# Patient Record
Sex: MALE | Employment: STUDENT | URBAN - METROPOLITAN AREA
[De-identification: names, ages, dates, MRNs, and addresses within clinical notes are randomized per-mention and may not be internally consistent; named-entity substitution may affect disease eponyms.]

---

## 2017-03-16 ENCOUNTER — APPOINTMENT (OUTPATIENT)
Dept: LAB | Age: 22
End: 2017-03-16
Attending: INTERNAL MEDICINE
Payer: COMMERCIAL

## 2017-03-16 ENCOUNTER — OFFICE VISIT (OUTPATIENT)
Dept: INTERNAL MEDICINE CLINIC | Facility: CLINIC | Age: 22
End: 2017-03-16

## 2017-03-16 VITALS
RESPIRATION RATE: 18 BRPM | DIASTOLIC BLOOD PRESSURE: 72 MMHG | WEIGHT: 149.38 LBS | BODY MASS INDEX: 24.01 KG/M2 | TEMPERATURE: 98 F | HEIGHT: 66 IN | SYSTOLIC BLOOD PRESSURE: 118 MMHG | HEART RATE: 82 BPM

## 2017-03-16 DIAGNOSIS — R61 HYPERHIDROSIS: ICD-10-CM

## 2017-03-16 DIAGNOSIS — Z00.00 ROUTINE PHYSICAL EXAMINATION: ICD-10-CM

## 2017-03-16 DIAGNOSIS — Z00.00 ROUTINE PHYSICAL EXAMINATION: Primary | ICD-10-CM

## 2017-03-16 LAB — TSH SERPL-ACNC: 1.09 UIU/ML (ref 0.34–5.6)

## 2017-03-16 PROCEDURE — 84443 ASSAY THYROID STIM HORMONE: CPT

## 2017-03-16 PROCEDURE — 36415 COLL VENOUS BLD VENIPUNCTURE: CPT

## 2017-03-16 PROCEDURE — 99385 PREV VISIT NEW AGE 18-39: CPT | Performed by: INTERNAL MEDICINE

## 2017-03-16 NOTE — PROGRESS NOTES
HPI:    Patient ID: Raquel Welch is a 25year old male. HPI  Patient here requesting a physical exam.  He has no past medical history or chronic conditions. He has had lifelong problems with sweaty hands.   Try prescription Bereket in the past and seemed PHYSICAL EXAM:   Physical Exam    Constitutional: He appears well-developed and well-nourished.    HENT:   Right Ear: Tympanic membrane and ear canal normal.   Left Ear: Tympanic membrane and ear canal normal.   Nose: Nose normal.   Mouth/Throat: Keon Sides prescriptions requested or ordered in this encounter    Imaging & Referrals:  None         ID#[id number]

## 2017-06-30 ENCOUNTER — APPOINTMENT (OUTPATIENT)
Dept: OTHER | Facility: HOSPITAL | Age: 22
End: 2017-06-30
Attending: EMERGENCY MEDICINE

## 2020-01-03 ENCOUNTER — OFFICE VISIT (OUTPATIENT)
Dept: INTERNAL MEDICINE CLINIC | Facility: CLINIC | Age: 25
End: 2020-01-03
Payer: COMMERCIAL

## 2020-01-03 VITALS
WEIGHT: 147.31 LBS | BODY MASS INDEX: 23.67 KG/M2 | HEART RATE: 77 BPM | SYSTOLIC BLOOD PRESSURE: 120 MMHG | DIASTOLIC BLOOD PRESSURE: 82 MMHG | HEIGHT: 66 IN

## 2020-01-03 DIAGNOSIS — Z00.00 ANNUAL PHYSICAL EXAM: Primary | ICD-10-CM

## 2020-01-03 PROCEDURE — 99395 PREV VISIT EST AGE 18-39: CPT | Performed by: INTERNAL MEDICINE

## 2020-01-03 NOTE — H&P
Elier Morales is a 22year old male who presents for a complete physical exam.   HPI:   In general he feels well. He does request STD testing. No prior history of or exposure to STDs.   He has had approximately 20-30 lifetime female sexual partners and al vomiting abdominal pain diarrhea constipation or rectal bleeding  : Occasional urinary frequency.   No dysuria or hematuria  MUSCULOSKELETAL: No leg swelling  NEURO: No headaches    EXAM:   GENERAL: Pleasant male appearing well in no distress  /82

## 2020-01-03 NOTE — PATIENT INSTRUCTIONS
Please obtain blood and urine testing soon. Please try to follow a healthy diet and exercise regularly. Please avoid all tobacco products. You should get a flu shot every fall.

## 2020-01-10 LAB
ALBUMIN/GLOBULIN RATIO: 1.6 (CALC) (ref 1–2.5)
ALBUMIN: 4.6 G/DL (ref 3.6–5.1)
ALKALINE PHOSPHATASE: 73 U/L (ref 40–115)
ALT: 17 U/L (ref 9–46)
AST: 16 U/L (ref 10–40)
BILIRUBIN, TOTAL: 0.6 MG/DL (ref 0.2–1.2)
BUN: 12 MG/DL (ref 7–25)
CALCIUM: 9.6 MG/DL (ref 8.6–10.3)
CARBON DIOXIDE: 28 MMOL/L (ref 20–32)
CHLAMYDIA TRACHOMATIS$RNA, TMA: NOT DETECTED
CHLORIDE: 101 MMOL/L (ref 98–110)
CHOL/HDLC RATIO: 3.8 (CALC)
CHOLESTEROL, TOTAL: 215 MG/DL
CREATININE: 0.74 MG/DL (ref 0.6–1.35)
EGFR IF AFRICN AM: 151 ML/MIN/1.73M2
EGFR IF NONAFRICN AM: 130 ML/MIN/1.73M2
GLOBULIN: 2.8 G/DL (CALC) (ref 1.9–3.7)
GLUCOSE: 102 MG/DL (ref 65–99)
HDL CHOLESTEROL: 56 MG/DL
HEMATOCRIT: 49.7 % (ref 38.5–50)
HEMOGLOBIN: 17.2 G/DL (ref 13.2–17.1)
LDL-CHOLESTEROL: 143 MG/DL (CALC)
MCH: 31.3 PG (ref 27–33)
MCHC: 34.6 G/DL (ref 32–36)
MCV: 90.4 FL (ref 80–100)
MPV: 10.7 FL (ref 7.5–12.5)
NEISSERIA GONORRHOEAE$RNA, TMA: NOT DETECTED
NON-HDL CHOLESTEROL: 159 MG/DL (CALC)
PLATELET COUNT: 295 THOUSAND/UL (ref 140–400)
POTASSIUM: 4.1 MMOL/L (ref 3.5–5.3)
PROTEIN, TOTAL: 7.4 G/DL (ref 6.1–8.1)
RDW: 11.7 % (ref 11–15)
RED BLOOD CELL COUNT: 5.5 MILLION/UL (ref 4.2–5.8)
SIGNAL TO CUT-OFF: 0.02
SODIUM: 139 MMOL/L (ref 135–146)
TRIGLYCERIDES: 66 MG/DL
WHITE BLOOD CELL COUNT: 7.8 THOUSAND/UL (ref 3.8–10.8)

## 2021-05-05 ENCOUNTER — LAB REQUISITION (OUTPATIENT)
Dept: LAB | Facility: HOSPITAL | Age: 26
End: 2021-05-05
Payer: COMMERCIAL

## 2021-05-05 DIAGNOSIS — Z01.818 ENCOUNTER FOR OTHER PREPROCEDURAL EXAMINATION: ICD-10-CM

## 2024-02-05 ENCOUNTER — LAB ENCOUNTER (OUTPATIENT)
Dept: LAB | Facility: HOSPITAL | Age: 29
End: 2024-02-05
Attending: INTERNAL MEDICINE
Payer: MEDICAID

## 2024-02-05 ENCOUNTER — OFFICE VISIT (OUTPATIENT)
Facility: CLINIC | Age: 29
End: 2024-02-05
Payer: MEDICAID

## 2024-02-05 VITALS
RESPIRATION RATE: 18 BRPM | DIASTOLIC BLOOD PRESSURE: 70 MMHG | HEART RATE: 98 BPM | BODY MASS INDEX: 26.05 KG/M2 | TEMPERATURE: 98 F | SYSTOLIC BLOOD PRESSURE: 116 MMHG | HEIGHT: 66.5 IN | WEIGHT: 164 LBS

## 2024-02-05 DIAGNOSIS — Z00.00 ROUTINE PHYSICAL EXAMINATION: ICD-10-CM

## 2024-02-05 DIAGNOSIS — R19.7 DIARRHEA, UNSPECIFIED TYPE: ICD-10-CM

## 2024-02-05 DIAGNOSIS — Z23 NEED FOR VACCINATION: ICD-10-CM

## 2024-02-05 DIAGNOSIS — Z00.00 ROUTINE PHYSICAL EXAMINATION: Primary | ICD-10-CM

## 2024-02-05 DIAGNOSIS — N50.811 RIGHT TESTICULAR PAIN: ICD-10-CM

## 2024-02-05 LAB
ALBUMIN SERPL-MCNC: 4.9 G/DL (ref 3.2–4.8)
ALBUMIN/GLOB SERPL: 1.6 {RATIO} (ref 1–2)
ALP LIVER SERPL-CCNC: 82 U/L
ALT SERPL-CCNC: 29 U/L
ANION GAP SERPL CALC-SCNC: 8 MMOL/L (ref 0–18)
AST SERPL-CCNC: 25 U/L (ref ?–34)
BASOPHILS # BLD AUTO: 0.02 X10(3) UL (ref 0–0.2)
BASOPHILS NFR BLD AUTO: 0.3 %
BILIRUB SERPL-MCNC: 0.9 MG/DL (ref 0.3–1.2)
BUN BLD-MCNC: 10 MG/DL (ref 9–23)
BUN/CREAT SERPL: 11.1 (ref 10–20)
CALCIUM BLD-MCNC: 9.7 MG/DL (ref 8.7–10.4)
CHLORIDE SERPL-SCNC: 106 MMOL/L (ref 98–112)
CHOLEST SERPL-MCNC: 231 MG/DL (ref ?–200)
CO2 SERPL-SCNC: 27 MMOL/L (ref 21–32)
CREAT BLD-MCNC: 0.9 MG/DL
DEPRECATED RDW RBC AUTO: 41.8 FL (ref 35.1–46.3)
EGFRCR SERPLBLD CKD-EPI 2021: 120 ML/MIN/1.73M2 (ref 60–?)
EOSINOPHIL # BLD AUTO: 0.11 X10(3) UL (ref 0–0.7)
EOSINOPHIL NFR BLD AUTO: 1.4 %
ERYTHROCYTE [DISTWIDTH] IN BLOOD BY AUTOMATED COUNT: 12.4 % (ref 11–15)
FASTING PATIENT LIPID ANSWER: YES
FASTING STATUS PATIENT QL REPORTED: YES
GLOBULIN PLAS-MCNC: 3.1 G/DL (ref 2.8–4.4)
GLUCOSE BLD-MCNC: 118 MG/DL (ref 70–99)
HCT VFR BLD AUTO: 50 %
HDLC SERPL-MCNC: 43 MG/DL (ref 40–59)
HGB BLD-MCNC: 17.2 G/DL
IMM GRANULOCYTES # BLD AUTO: 0.01 X10(3) UL (ref 0–1)
IMM GRANULOCYTES NFR BLD: 0.1 %
LDLC SERPL CALC-MCNC: 160 MG/DL (ref ?–100)
LYMPHOCYTES # BLD AUTO: 2.83 X10(3) UL (ref 1–4)
LYMPHOCYTES NFR BLD AUTO: 35.5 %
MCH RBC QN AUTO: 31.7 PG (ref 26–34)
MCHC RBC AUTO-ENTMCNC: 34.4 G/DL (ref 31–37)
MCV RBC AUTO: 92.1 FL
MONOCYTES # BLD AUTO: 0.51 X10(3) UL (ref 0.1–1)
MONOCYTES NFR BLD AUTO: 6.4 %
NEUTROPHILS # BLD AUTO: 4.5 X10 (3) UL (ref 1.5–7.7)
NEUTROPHILS # BLD AUTO: 4.5 X10(3) UL (ref 1.5–7.7)
NEUTROPHILS NFR BLD AUTO: 56.3 %
NONHDLC SERPL-MCNC: 188 MG/DL (ref ?–130)
OSMOLALITY SERPL CALC.SUM OF ELEC: 292 MOSM/KG (ref 275–295)
PLATELET # BLD AUTO: 276 10(3)UL (ref 150–450)
POTASSIUM SERPL-SCNC: 4.4 MMOL/L (ref 3.5–5.1)
PROT SERPL-MCNC: 8 G/DL (ref 5.7–8.2)
RBC # BLD AUTO: 5.43 X10(6)UL
SODIUM SERPL-SCNC: 141 MMOL/L (ref 136–145)
TRIGL SERPL-MCNC: 156 MG/DL (ref 30–149)
TSI SER-ACNC: 1.76 MIU/ML (ref 0.55–4.78)
VLDLC SERPL CALC-MCNC: 30 MG/DL (ref 0–30)
WBC # BLD AUTO: 8 X10(3) UL (ref 4–11)

## 2024-02-05 PROCEDURE — 85025 COMPLETE CBC W/AUTO DIFF WBC: CPT

## 2024-02-05 PROCEDURE — 80053 COMPREHEN METABOLIC PANEL: CPT

## 2024-02-05 PROCEDURE — 84443 ASSAY THYROID STIM HORMONE: CPT

## 2024-02-05 PROCEDURE — 36415 COLL VENOUS BLD VENIPUNCTURE: CPT

## 2024-02-05 PROCEDURE — 80061 LIPID PANEL: CPT

## 2024-02-05 RX ORDER — TRAMADOL HYDROCHLORIDE 50 MG/1
1 TABLET ORAL EVERY 6 HOURS PRN
COMMUNITY
End: 2024-02-05 | Stop reason: ALTCHOICE

## 2024-02-05 RX ORDER — OCTISALATE, AVOBENZONE, HOMOSALATE, AND OCTOCRYLENE 29.4; 29.4; 49; 25.48 MG/ML; MG/ML; MG/ML; MG/ML
LOTION TOPICAL
COMMUNITY
End: 2024-02-05

## 2024-02-05 RX ORDER — AMOXICILLIN 500 MG/1
500 TABLET, FILM COATED ORAL EVERY 8 HOURS
COMMUNITY
End: 2024-02-05 | Stop reason: ALTCHOICE

## 2024-02-05 RX ORDER — PSYLLIUM HUSK 0.4 G
CAPSULE ORAL
COMMUNITY
End: 2024-02-05 | Stop reason: ALTCHOICE

## 2024-02-05 RX ORDER — IBUPROFEN 600 MG/1
1 TABLET ORAL EVERY 6 HOURS PRN
COMMUNITY
End: 2024-02-05 | Stop reason: ALTCHOICE

## 2024-02-05 RX ORDER — HYDROCODONE BITARTRATE AND ACETAMINOPHEN 10; 325 MG/1; MG/1
1 TABLET ORAL
COMMUNITY
End: 2024-02-05 | Stop reason: ALTCHOICE

## 2024-02-05 NOTE — PROGRESS NOTES
HPI:    Patient ID: Rick Pritchett is a 28 year old male.    HPI  Patient is here requesting a physical exam.  He was last seen in internal medicine by different doctor in 2020 for physical exam and STD check.  Current medications reviewed.  Health maintenance and vaccination status reviewed.     Pt gets diarrhea randomly; less since he started working out. Diarrhea on and off for years. Can occur with going out to eat. Sometimes with greasy food and meat. No blood in stool. [place] occurs right after he eats lunch or dinner. Each episode is a few times of watery diarrhea; no blood; has tried more fiber. Less diarrhea with exercising more. He discussed it with the doctor 3 years ago; he never followed up with GI. Has bump on the lower right leg for years; it is not changing. Has pain in the right testicle; had it intermittently for a week about a month ago; then again last week; not active now. No issues with urination or sexual function. Appetite is good. Diet is better; not eating out as much. No tobacco. Occasional THC, but less so now. EtOH is on the weekends- 2-3 drinks.     Patient Active Problem List   Diagnosis    Hyperhidrosis    Hypercholesterolemia          HISTORY:  Past Medical History:   Diagnosis Date    Hypercholesterolemia       History reviewed. No pertinent surgical history.   Family History   Problem Relation Age of Onset    Hypertension Maternal Grandfather     Prostate Cancer Maternal Grandfather     Hypertension Maternal Grandmother     Other (Other) Maternal Grandmother         Renal failure    Lipids Paternal Grandfather         Hyperlipidemia    Hypertension Mother       Social History     Socioeconomic History    Marital status: Single   Tobacco Use    Smoking status: Never    Smokeless tobacco: Never   Vaping Use    Vaping Use: Never used   Substance and Sexual Activity    Alcohol use: Yes     Alcohol/week: 0.0 standard drinks of alcohol     Comment: 5 beers 3 days weekly    Drug use: Yes      Frequency: 1.0 times per week     Types: Cannabis    Sexual activity: Not Currently     Partners: Female          Review of Systems          No current outpatient medications on file.     Allergies:No Known Allergies     PHYSICAL EXAM:   /70 (BP Location: Left arm, Patient Position: Sitting, Cuff Size: large)   Pulse 98   Temp 98.2 °F (36.8 °C) (Other)   Resp 18   Ht 5' 6.5\" (1.689 m)   Wt 164 lb (74.4 kg)   BMI 26.07 kg/m²      Physical Exam  Constitutional:       Appearance: Normal appearance. He is well-developed.   HENT:      Right Ear: Tympanic membrane and ear canal normal.      Left Ear: Tympanic membrane and ear canal normal.      Nose: Nose normal.      Mouth/Throat:      Pharynx: No oropharyngeal exudate or posterior oropharyngeal erythema.   Eyes:      Conjunctiva/sclera: Conjunctivae normal.      Pupils: Pupils are equal, round, and reactive to light.   Neck:      Thyroid: No thyromegaly.      Vascular: No carotid bruit.   Cardiovascular:      Rate and Rhythm: Normal rate and regular rhythm.      Pulses: Normal pulses.      Heart sounds: Normal heart sounds. No murmur heard.  Pulmonary:      Effort: Pulmonary effort is normal.      Breath sounds: Normal breath sounds. No wheezing or rales.   Abdominal:      General: Bowel sounds are normal.      Palpations: Abdomen is soft. There is no mass.      Tenderness: There is no abdominal tenderness.      Hernia: There is no hernia in the left inguinal area.   Genitourinary:     Penis: Normal and circumcised.       Testes: Normal.   Musculoskeletal:      Right lower leg: No edema.      Left lower leg: No edema.   Lymphadenopathy:      Cervical: No cervical adenopathy.   Skin:     General: Skin is warm and dry.      Findings: No rash.   Neurological:      General: No focal deficit present.      Mental Status: He is alert.      Cranial Nerves: No cranial nerve deficit.      Coordination: Coordination normal.   Psychiatric:         Mood and Affect:  Mood normal.         Behavior: Behavior normal.         Thought Content: Thought content normal.         Judgment: Judgment normal.          Wt Readings from Last 6 Encounters:   02/05/24 164 lb (74.4 kg)   01/03/20 147 lb 4.8 oz (66.8 kg)   03/16/17 149 lb 6.4 oz (67.8 kg)             ASSESSMENT/PLAN:   1. Routine physical examination  Physical exam is unremarkable.  Active issues as below.  Health maintenance issues reviewed.  We will check fasting blood work and contact patient with results.  Encouraged healthy diet and regular exercise.  Maintain healthy body weight.  - CBC With Differential With Platelet; Future  - Comp Metabolic Panel (14); Future  - Lipid Panel; Future  - TSH W Reflex To Free T4; Future    2. Diarrhea, unspecified type  Patient with intermittent diarrhea.  Most likely functional.  Doubt any serious pathology.  Check blood work.  Given printed information about lactose intolerance.  Advised that the for step is to do is to cut out lactose altogether and see if that helps or not.  Contact the office if things or not improving.  May consider GI consult or stool studies if things become more persistent and worse.    3. Right testicular pain  Patient with a couple episodes of right testicular pain.  Exam is normal.  Given the symptoms and his age, we will go ahead and check ultrasound to evaluate for underlying testicular cancer.  - US SCROTUM W/ DOPPLER (CPT=93975/23999); Future    4. Need for vaccination  Vaccination status reviewed.  Given tetanus booster today.  - TdaP (Boostrix/Adacel) Vaccine (> 7 Y)         Meds This Visit:  Requested Prescriptions      No prescriptions requested or ordered in this encounter       Imaging & Referrals:  TETANUS, DIPHTHERIA TOXOIDS AND ACELLULAR PERTUSIS VACCINE (TDAP), >7 YEARS, IM USE  US SCROTUM W/ DOPPLER (NCK=96842/92539)         Selwyn Smith MD

## 2024-02-20 ENCOUNTER — HOSPITAL ENCOUNTER (OUTPATIENT)
Dept: ULTRASOUND IMAGING | Facility: HOSPITAL | Age: 29
Discharge: HOME | End: 2024-02-20
Attending: INTERNAL MEDICINE
Payer: MEDICAID

## 2024-02-20 DIAGNOSIS — N50.811 RIGHT TESTICULAR PAIN: ICD-10-CM

## 2024-02-20 PROCEDURE — 76870 US EXAM SCROTUM: CPT | Performed by: INTERNAL MEDICINE

## 2024-02-20 PROCEDURE — 93975 VASCULAR STUDY: CPT | Performed by: INTERNAL MEDICINE

## 2024-12-10 ENCOUNTER — LAB ENCOUNTER (OUTPATIENT)
Dept: LAB | Facility: HOSPITAL | Age: 29
End: 2024-12-10
Attending: NURSE PRACTITIONER
Payer: COMMERCIAL

## 2024-12-10 ENCOUNTER — OFFICE VISIT (OUTPATIENT)
Facility: CLINIC | Age: 29
End: 2024-12-10
Payer: COMMERCIAL

## 2024-12-10 VITALS
SYSTOLIC BLOOD PRESSURE: 137 MMHG | BODY MASS INDEX: 27.93 KG/M2 | WEIGHT: 175.88 LBS | DIASTOLIC BLOOD PRESSURE: 82 MMHG | HEIGHT: 66.5 IN | HEART RATE: 103 BPM

## 2024-12-10 DIAGNOSIS — R19.7 DIARRHEA, UNSPECIFIED TYPE: Primary | ICD-10-CM

## 2024-12-10 DIAGNOSIS — R19.7 DIARRHEA, UNSPECIFIED TYPE: ICD-10-CM

## 2024-12-10 LAB
CRP SERPL-MCNC: <0.4 MG/DL (ref ?–1)
IGA SERPL-MCNC: 277.4 MG/DL (ref 40–350)
TSI SER-ACNC: 1.9 UIU/ML (ref 0.55–4.78)

## 2024-12-10 PROCEDURE — 3079F DIAST BP 80-89 MM HG: CPT | Performed by: NURSE PRACTITIONER

## 2024-12-10 PROCEDURE — 86140 C-REACTIVE PROTEIN: CPT

## 2024-12-10 PROCEDURE — 3075F SYST BP GE 130 - 139MM HG: CPT | Performed by: NURSE PRACTITIONER

## 2024-12-10 PROCEDURE — 84443 ASSAY THYROID STIM HORMONE: CPT

## 2024-12-10 PROCEDURE — 86364 TISS TRNSGLTMNASE EA IG CLAS: CPT

## 2024-12-10 PROCEDURE — 36415 COLL VENOUS BLD VENIPUNCTURE: CPT

## 2024-12-10 PROCEDURE — 3008F BODY MASS INDEX DOCD: CPT | Performed by: NURSE PRACTITIONER

## 2024-12-10 PROCEDURE — 99204 OFFICE O/P NEW MOD 45 MIN: CPT | Performed by: NURSE PRACTITIONER

## 2024-12-10 PROCEDURE — 82784 ASSAY IGA/IGD/IGG/IGM EACH: CPT

## 2024-12-10 NOTE — H&P
[facility] - Gastroenterology                                                                                                               Reason for consult: diarrhea    Requesting physician or provider: No primary care provider on file.    Chief Complaint   Patient presents with    Consult     For Diarrhea on and off       HPI:   Rick Pritchett is a 29 year old year-old male with medical history of hyperlipidemia.     He is here today for evaluation of intermittent diarrhea. Has not been able to detect specific dietary triggers. Usually has to go within an hour of eating- has urgency. Stool can be watery. Does not have any abdominal pain. Has tried fiber supplement but did not help. Has seen higher fiber foods like fruit and vegetables in the stool. Has had some acid reflux lately. No blood in stool.     Patient denies symptoms of nausea, vomiting, dyspepsia, dysphagia, odynophagia, globus sensation, hematemesis, abdominal pain, constipation, hematochezia, or melena. he denies recent change in appetite, fever or unintentional weight loss.      Last colonoscopy: none  Last EGD: none    NSAIDS: not frequently   Tobacco: none  Alcohol: social   Marijuana: occasionally   Illicit drugs: none    FH GI malignancy: paternal grandfather esophageal cancer   FH IBD: none    No history of adverse reaction to sedation  No SHAHRIAR  No anticoagulants/antiplatelet  No pacemaker/defibrillator    Wt Readings from Last 6 Encounters:   12/10/24 175 lb 14.4 oz (79.8 kg)   02/05/24 164 lb (74.4 kg)   01/03/20 147 lb 4.8 oz (66.8 kg)   03/16/17 149 lb 6.4 oz (67.8 kg)        History, Medications, Allergies, ROS:      Past Medical History:    Hypercholesterolemia      History reviewed. No pertinent surgical history.   Family Hx:   Family History   Problem Relation Age of Onset    Hypertension Maternal Grandfather     Prostate Cancer Maternal  Grandfather     Hypertension Maternal Grandmother     Other (Other) Maternal Grandmother         Renal failure    Lipids Paternal Grandfather         Hyperlipidemia    Cancer Paternal Grandfather         Esophageal Cancer    Hypertension Mother       Social History:   Social History     Socioeconomic History    Marital status: Single   Tobacco Use    Smoking status: Never    Smokeless tobacco: Never   Vaping Use    Vaping status: Never Used   Substance and Sexual Activity    Alcohol use: Yes     Alcohol/week: 8.0 standard drinks of alcohol     Types: 4 Shots of liquor, 4 Standard drinks or equivalent per week     Comment: only on the weekends    Drug use: Yes     Frequency: 1.0 times per week     Types: Cannabis     Comment: past cannabis smoker not as much any more    Sexual activity: Not Currently     Partners: Female        Medications (Active prior to today's visit):  No current outpatient medications on file.       Allergies:  Allergies[1]    ROS:   CONSTITUTIONAL: negative for fevers, chills, sweats  EYES Negative for scleral icterus or redness, and diplopia  HEENT: Negative for hoarseness  RESPIRATORY: Negative for cough and severe shortness of breath  CARDIOVASCULAR: Negative for crushing sub-sternal chest pain  GASTROINTESTINAL: See HPI  GENITOURINARY: Negative for dysuria  MUSCULOSKELETAL: Negative for arthralgias and myalgias  SKIN: Negative for jaundice, rash or pruritus  NEUROLOGICAL: Negative for dizziness and headaches  BEHAVIOR/PSYCH: Negative for psychotic behavior    PHYSICAL EXAM:   Blood pressure 137/82, pulse 103, height 5' 6.5\" (1.689 m), weight 175 lb 14.4 oz (79.8 kg).    GEN: Alert, no acute distress, well-nourished   HEENT: anicteric sclera, neck supple, trachea midline, MMM, no palpable or tender neck or supraclavicular lymph nodes  CV: RRR, the extremities are warm and well perfused   ABDOMEN: Soft, symmetrical, non-tender without distention or guarding. No scars or lesions. Aorta is  without bruit or visible pulsation. Umbilicus is midline without herniation. Normoactive bowel sounds are present, No masses, hepatomegaly or splenomegaly noted.  MSK: No erythema, no warmth, no swelling of joints  SKIN: No jaundice, no erythema, no rashes, no lesions  HEMATOLOGIC: No bleeding, no bruising  NEURO: Alert and interactive, RODRIGUEZ  PSYCH: appropriate mood & affect    Labs/Imaging/Procedures:     Patient's pertinent labs and imaging were reviewed and discussed with patient today.        .  ASSESSMENT/PLAN:   29 year old male presents for intermittent diarrhea.    Denies any abdominal pain. No blood in stool. Will obtain labs and stool testing, follow up pending results. Ok to try Imodium as needed.     1. Diarrhea, unspecified type  - Calprotectin, Fecal; Future  - Tissue Transglutaminase Ab, IgA; Future  - Immunoglobulin A, Quant; Future  - Assay, Thyroid Stim Hormone; Future  - C. diff toxigenic PCR (OPT); Future  - H. Pylori Stool Ag, EIA; Future  - C-Reactive Protein; Future           There are no Patient Instructions on file for this visit.     Orders This Visit:  Orders Placed This Encounter   Procedures    Calprotectin, Fecal    Tissue Transglutaminase Ab, IgA    Immunoglobulin A, Quant    Assay, Thyroid Stim Hormone    H. Pylori Stool Ag, EIA    C-Reactive Protein    C. diff toxigenic PCR (OPT)       Meds This Visit:  Requested Prescriptions      No prescriptions requested or ordered in this encounter       Imaging & Referrals:  None      ZENOBIA Ponce    [facility] Gastroenterology  12/10/2024               [1] No Known Allergies

## 2024-12-11 ENCOUNTER — APPOINTMENT (OUTPATIENT)
Dept: LAB | Facility: HOSPITAL | Age: 29
End: 2024-12-11
Attending: NURSE PRACTITIONER
Payer: COMMERCIAL

## 2024-12-11 LAB — TTG IGA SER-ACNC: 0.5 U/ML (ref ?–7)

## 2024-12-11 PROCEDURE — 87493 C DIFF AMPLIFIED PROBE: CPT

## 2024-12-11 PROCEDURE — 87338 HPYLORI STOOL AG IA: CPT

## 2024-12-11 PROCEDURE — 83993 ASSAY FOR CALPROTECTIN FECAL: CPT

## 2024-12-12 ENCOUNTER — LAB ENCOUNTER (OUTPATIENT)
Dept: LAB | Facility: HOSPITAL | Age: 29
End: 2024-12-12
Attending: NURSE PRACTITIONER
Payer: COMMERCIAL

## 2024-12-12 DIAGNOSIS — R19.7 DIARRHEA, UNSPECIFIED TYPE: ICD-10-CM

## 2024-12-13 LAB — C DIFF TOX B STL QL: NEGATIVE

## 2024-12-14 LAB
CALPROTECTIN STL-MCNT: 5.27 ΜG/G (ref ?–50)
H PYLORI AG STL QL IA: NEGATIVE

## (undated) NOTE — MR AVS SNAPSHOT
[facility]  [address] [place] [address], [address] [place] [address]  [facility]               Thank you for choosing us for your health care visit with Ashley Abbott MD.  We are glad to serve you and happy to provide you with this summary of

## (undated) NOTE — LETTER
1/10/2020              Dee [address]         Dear Panchito Hooper,    It was a pleasure seeing you recently in the office. I am now reporting on your recent labs.     Your electrolytes (sodium, potassium, etc)

## (undated) NOTE — Clinical Note
March 17, 2017     [facility] [id number]      Dear Nupur Hopkins:    Below are the results from your recent visit:    Resulted Orders   ASSAY, THYROID STIM HORMONE   Result Value Ref Range    TSH 1.09 0.34-5.60 uIU/mL     The TSH